# Patient Record
Sex: MALE | Race: WHITE | NOT HISPANIC OR LATINO | Employment: STUDENT | URBAN - METROPOLITAN AREA
[De-identification: names, ages, dates, MRNs, and addresses within clinical notes are randomized per-mention and may not be internally consistent; named-entity substitution may affect disease eponyms.]

---

## 2024-05-07 ENCOUNTER — HOSPITAL ENCOUNTER (EMERGENCY)
Facility: CLINIC | Age: 19
Discharge: HOME | End: 2024-05-08
Attending: EMERGENCY MEDICINE | Admitting: EMERGENCY MEDICINE
Payer: COMMERCIAL

## 2024-05-07 DIAGNOSIS — F43.0 ACUTE REACTION TO SITUATIONAL STRESS: ICD-10-CM

## 2024-05-07 PROCEDURE — 99283 EMERGENCY DEPT VISIT LOW MDM: CPT

## 2024-05-07 ASSESSMENT — COLUMBIA-SUICIDE SEVERITY RATING SCALE - C-SSRS
5. HAVE YOU STARTED TO WORK OUT OR WORKED OUT THE DETAILS OF HOW TO KILL YOURSELF? DO YOU INTEND TO CARRY OUT THIS PLAN?: NO
1. IN THE PAST MONTH, HAVE YOU WISHED YOU WERE DEAD OR WISHED YOU COULD GO TO SLEEP AND NOT WAKE UP?: NO
6. HAVE YOU EVER DONE ANYTHING, STARTED TO DO ANYTHING, OR PREPARED TO DO ANYTHING TO END YOUR LIFE?: NO
4. HAVE YOU HAD THESE THOUGHTS AND HAD SOME INTENTION OF ACTING ON THEM?: NO
3. HAVE YOU BEEN THINKING ABOUT HOW YOU MIGHT KILL YOURSELF?: NO
2. HAVE YOU ACTUALLY HAD ANY THOUGHTS OF KILLING YOURSELF IN THE PAST MONTH?: NO

## 2024-05-07 ASSESSMENT — ACTIVITIES OF DAILY LIVING (ADL): ADLS_ACUITY_SCORE: 35

## 2024-05-08 VITALS
TEMPERATURE: 98.9 F | OXYGEN SATURATION: 96 % | SYSTOLIC BLOOD PRESSURE: 126 MMHG | RESPIRATION RATE: 18 BRPM | DIASTOLIC BLOOD PRESSURE: 64 MMHG | HEART RATE: 82 BPM

## 2024-05-08 PROBLEM — F33.9 MAJOR DEPRESSIVE DISORDER, RECURRENT, UNSPECIFIED (H): Status: ACTIVE | Noted: 2024-05-08

## 2024-05-08 LAB
C TRACH DNA SPEC QL NAA+PROBE: NEGATIVE
HIV 1+2 AB+HIV1 P24 AG SERPL QL IA: NONREACTIVE
N GONORRHOEA DNA SPEC QL NAA+PROBE: NEGATIVE

## 2024-05-08 PROCEDURE — 87491 CHLMYD TRACH DNA AMP PROBE: CPT | Performed by: EMERGENCY MEDICINE

## 2024-05-08 PROCEDURE — 36415 COLL VENOUS BLD VENIPUNCTURE: CPT | Performed by: EMERGENCY MEDICINE

## 2024-05-08 PROCEDURE — 87591 N.GONORRHOEAE DNA AMP PROB: CPT | Performed by: EMERGENCY MEDICINE

## 2024-05-08 PROCEDURE — 87389 HIV-1 AG W/HIV-1&-2 AB AG IA: CPT | Performed by: EMERGENCY MEDICINE

## 2024-05-08 ASSESSMENT — ACTIVITIES OF DAILY LIVING (ADL)
ADLS_ACUITY_SCORE: 35

## 2024-05-08 NOTE — ED NOTES
"Pt is informed that he is getting discharge, but pt wants to \"sleep a bit longer\". Explained to pt that this would not be an option. Lights were turned on to get pt ready. Pt got out of the bed and turned down the light and curtain the moment writer left the room. Security was called for assistance for escort. Pt is coherent and VSS at discharge   "

## 2024-05-08 NOTE — ED NOTES
"DEC attempted to assess pt, pt refused to talk to DEC. Stated he is \"Too tired to talk right now\". Bhaktiter is at the bedside   "

## 2024-05-08 NOTE — ED TRIAGE NOTES
Pt went to [facility] station for help, Patient moved from [place] to live with grandma and had and altercation about not being home on time. Pt does not feel welcome there and verbalizes that he has lots of mental health disorders and is struggling. Pt denies harming self or others today.  And States today he took a drug off the street. Pt presents stressed out, scattered thoughts and speech.     In addition pt ask for tests to be done for sexual diseases, states had unprotected sex today.      Pt verbalizes during triage that he does not want information shared with grandma about his cares.   Pt states makes his own decisions.
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
## 2024-05-08 NOTE — CONSULTS
"5/7/2024  Sergo RAMSAY Clark 2005     Writer consulted with ED Nurse,  on this date at 1:15 am. It was determined that pt would not benefit from assessment at this time due to Pt unable able to participate in assessment. Patient told writer that He \"too tired\" and want to \"sleep.\" Patient chose to end the assessment.     ED will call DEC at [phone] when pt is ready and able to participate in assessment.     Sandy Elkins, MATTHIAS    "

## 2024-05-08 NOTE — ED NOTES
"Pt finished DEC assessment, approached to the nurse and stated that \"I want to be discharge as soon as possible but I have nowhere to go\", \"the person I talk to was not helpful she doesn't know what I have been thru\". Educated pt that writer will let doctor know about pt's concern. Pt then ask if he can be given a some \"condoms\". [facility]. Pt returned to the room without any issues     Pt constantly asking writer to charge his phone, which was charged to 87% prior to given to back to pt. Pt repeated that he has no where to go, ask for a ride to go to the \"[facility] station\" which was closer to his residency   "